# Patient Record
Sex: FEMALE | Race: WHITE | Employment: FULL TIME | URBAN - METROPOLITAN AREA
[De-identification: names, ages, dates, MRNs, and addresses within clinical notes are randomized per-mention and may not be internally consistent; named-entity substitution may affect disease eponyms.]

---

## 2022-07-06 ENCOUNTER — INITIAL PRENATAL (OUTPATIENT)
Dept: OBGYN CLINIC | Age: 27
End: 2022-07-06
Payer: COMMERCIAL

## 2022-07-06 VITALS — HEIGHT: 68 IN | WEIGHT: 166 LBS | BODY MASS INDEX: 25.16 KG/M2

## 2022-07-06 DIAGNOSIS — Z3A.08 8 WEEKS GESTATION OF PREGNANCY: ICD-10-CM

## 2022-07-06 DIAGNOSIS — Z34.01 ENCOUNTER FOR SUPERVISION OF NORMAL FIRST PREGNANCY IN FIRST TRIMESTER: ICD-10-CM

## 2022-07-06 DIAGNOSIS — Z32.01 PREGNANCY TEST POSITIVE: ICD-10-CM

## 2022-07-06 DIAGNOSIS — O36.80X0 ENCOUNTER TO DETERMINE FETAL VIABILITY OF PREGNANCY, SINGLE OR UNSPECIFIED FETUS: Primary | ICD-10-CM

## 2022-07-06 LAB
ABO, EXTERNAL RESULT: NORMAL
BASOPHILS # BLD: 0.1 K/UL (ref 0–0.2)
BASOPHILS NFR BLD: 1 % (ref 0–2)
DIFFERENTIAL METHOD BLD: NORMAL
EOSINOPHIL # BLD: 0.4 K/UL (ref 0–0.8)
EOSINOPHIL NFR BLD: 6 % (ref 0.5–7.8)
ERYTHROCYTE [DISTWIDTH] IN BLOOD BY AUTOMATED COUNT: 14 % (ref 11.9–14.6)
HCG, PREGNANCY, URINE, POC: POSITIVE
HCT VFR BLD AUTO: 36.4 % (ref 35.8–46.3)
HEP B, EXTERNAL RESULT: NORMAL
HEPATITIS C ANTIBODY, EXTERNAL RESULT: NORMAL
HGB BLD-MCNC: 12.1 G/DL (ref 11.7–15.4)
HIV, EXTERNAL RESULT: NORMAL
IMM GRANULOCYTES # BLD AUTO: 0 K/UL (ref 0–0.5)
IMM GRANULOCYTES NFR BLD AUTO: 0 % (ref 0–5)
LYMPHOCYTES # BLD: 2.2 K/UL (ref 0.5–4.6)
LYMPHOCYTES NFR BLD: 33 % (ref 13–44)
MCH RBC QN AUTO: 29.4 PG (ref 26.1–32.9)
MCHC RBC AUTO-ENTMCNC: 33.2 G/DL (ref 31.4–35)
MCV RBC AUTO: 88.3 FL (ref 79.6–97.8)
MONOCYTES # BLD: 0.4 K/UL (ref 0.1–1.3)
MONOCYTES NFR BLD: 6 % (ref 4–12)
NEUTS SEG # BLD: 3.5 K/UL (ref 1.7–8.2)
NEUTS SEG NFR BLD: 54 % (ref 43–78)
NRBC # BLD: 0 K/UL (ref 0–0.2)
PLATELET # BLD AUTO: 281 K/UL (ref 150–450)
PMV BLD AUTO: 10.7 FL (ref 9.4–12.3)
RBC # BLD AUTO: 4.12 M/UL (ref 4.05–5.2)
RH FACTOR, EXTERNAL RESULT: POSITIVE
RPR, EXTERNAL RESULT: NORMAL
RUBELLA TITER, EXTERNAL RESULT: NORMAL
VALID INTERNAL CONTROL, POC: YES
WBC # BLD AUTO: 6.6 K/UL (ref 4.3–11.1)

## 2022-07-06 PROCEDURE — 81025 URINE PREGNANCY TEST: CPT | Performed by: NURSE PRACTITIONER

## 2022-07-06 PROCEDURE — 76817 TRANSVAGINAL US OBSTETRIC: CPT | Performed by: NURSE PRACTITIONER

## 2022-07-06 NOTE — PROGRESS NOTES
John Kowalski G1, P0 presents to the office today for NOB talk and ultrasound. EDC is 2/13/23 based off of US. Patient education was discussed including: nutrition, appropriate weight gain, diet, exercise, travel, hospital classes, breastfeeding/lactation services, flu vaccine, Tdap, glucola, GBS, and Corona Virus and Zika precautions. Genetic testing discussed in depth and patient elects undecided. She is to return to the office in 4 weeks for NOB exam. All questions answered and she voiced full understanding. She is encouraged to call the office with any questions or concerns.

## 2022-07-07 LAB
ABO + RH BLD: NORMAL
BLOOD GROUP ANTIBODIES SERPL: NORMAL
HBV SURFACE AG SER QL: NONREACTIVE
HCV AB SER QL: NONREACTIVE
HIV 1+2 AB+HIV1 P24 AG SERPL QL IA: NONREACTIVE
HIV 1/2 RESULT COMMENT: NORMAL
RUBV IGG SERPL IA-ACNC: 14.3 IU/ML (ref 0–50)

## 2022-07-08 LAB — RPR SER QL: NON REACTIVE

## 2022-07-09 LAB
BACTERIA SPEC CULT: NORMAL
SERVICE CMNT-IMP: NORMAL

## 2022-07-14 ENCOUNTER — ROUTINE PRENATAL (OUTPATIENT)
Dept: OBGYN CLINIC | Age: 27
End: 2022-07-14
Payer: COMMERCIAL

## 2022-07-14 VITALS — HEIGHT: 68 IN | WEIGHT: 166.4 LBS | BODY MASS INDEX: 25.22 KG/M2

## 2022-07-14 DIAGNOSIS — O20.0 THREATENED ABORTION: Primary | ICD-10-CM

## 2022-07-14 PROCEDURE — 99214 OFFICE O/P EST MOD 30 MIN: CPT | Performed by: OBSTETRICS & GYNECOLOGY

## 2022-07-14 PROCEDURE — 76817 TRANSVAGINAL US OBSTETRIC: CPT | Performed by: OBSTETRICS & GYNECOLOGY

## 2022-07-14 NOTE — PROGRESS NOTES
The patient is a 28 y.o. Jolie Felling who is seen for TAB ultrasound performed secondary to spotting and cramping localized on the left side. EOB ultrasound finding of 22: Single IUD with +FHT= 183 BPM. CRL is not consistent with EDC determined by LMP. Ultrasound assigned SHAHAB= 2023. YS visualized. Right ovary visualized with follicles and appears within normal limits. Left ovary visualized with follicles and CL. Uterus is anteverted. Cervix appears within normal limits. Ultrasound findings of today: Single IUP +FHT= 186. Patient had early OB ultrasound on 22. At that time CRL measured 8W2D. Today's ultrasound demonstrated a CRL of 8w6d. Based off previous ultrasound the CRL should be approximately 9w3d. YS visualized. Right ovary visualized with follicles and appears within normal limits. Left ovary visualized with follicles and appears within normal limits. Uterus is anteverted and homogenous. A [facility] is noted measuring 1.2 x 0.7 x 0.7 cm. Cervix appears within normal limits. HISTORY:    Patient's last menstrual period was 2022. Current Outpatient Medications on File Prior to Visit   Medication Sig Dispense Refill    Prenatal-FeFum-FA-DHA w/o A (PRENATAL + DHA PO) Take by mouth       No current facility-administered medications on file prior to visit. ROS:  Feeling well. No dyspnea or chest pain on exertion. No abdominal pain, change in bowel habits, black or bloody stools. No urinary tract symptoms, she complains of vaginal bleeding since last pm.    PHYSICAL EXAM:  Height 5' 8\" (1.727 m), weight 166 lb 6.4 oz (75.5 kg), last menstrual period 2022. The patient appears well, alert, oriented x 3, in no distress. ASSESSMENT:    1. Threatened   -     AMB POC US OB TRANSVAGINAL   Viable single IUP at 8w 6 days, still within growth expectations. PLAN:  All questions answered  Pelvic ultrasound reviewed, will repeat next week.  Miscarriage

## 2022-07-21 ENCOUNTER — ROUTINE PRENATAL (OUTPATIENT)
Dept: OBGYN CLINIC | Age: 27
End: 2022-07-21
Payer: COMMERCIAL

## 2022-07-21 VITALS — BODY MASS INDEX: 25.09 KG/M2 | WEIGHT: 165 LBS

## 2022-07-21 DIAGNOSIS — O03.9 SAB (SPONTANEOUS ABORTION): Primary | ICD-10-CM

## 2022-07-21 PROCEDURE — 76817 TRANSVAGINAL US OBSTETRIC: CPT | Performed by: OBSTETRICS & GYNECOLOGY

## 2022-07-21 PROCEDURE — 99214 OFFICE O/P EST MOD 30 MIN: CPT | Performed by: OBSTETRICS & GYNECOLOGY

## 2022-07-21 NOTE — PROGRESS NOTES
Subjective:      Mahi Caldera is a 28 y. o. who presents for SAB follow up ultrasound. After she left the office on 22 she experienced heavy vaginal bleeding and passed large clots/ tissue. She is barely bleeding now. Discussed that her us looks like she passed everything. She has only been  3 months and they were not really trying to conceive. She does not want to go on birth control. Discussed not anything she did. If had 3 miscarriage then would do workup. Continue pnv but really can try again after 1 cycle. Her  is present today. EOB ultrasound finding of 22: Single IUD with +FHT= 183 BPM. CRL is not consistent with EDC determined by LMP. Ultrasound assigned SHAHAB= 2023. YS visualized. Right ovary visualized with follicles and appears within normal limits. Left ovary visualized with follicles and CL. Uterus is anteverted. Cervix appears within normal limits. Ultrasound findings from 22: Single IUP +FHT= 186. Patient had early OB ultrasound on 22. At that time CRL measured 8W2D. Today's ultrasound demonstrated a CRL of 8w6d. Based off previous ultrasound the CRL should be approximately 9w3d. YS visualized. Right ovary visualized with follicles and appears within normal limits. Left ovary visualized with follicles and appears within normal limits. Uterus is anteverted and homogenous. A [facility] is noted measuring 1.2 x 0.7 x 0.7 cm. Cervix appears within normal limits. Today's ultrasound: Cervix appears within normal limits. Uterus is anteverted and homogenous. Endo= 5.7 mm, no RPOC seen. Findings c/w complete AB. Right ovary visualized with follicles and appears within normal limits. Left ovary visualized with follicles and appears within normal limits. No adnexa masses or fluid seen. Uterus volume= 99.39 ml.     Past Medical History:   Diagnosis Date    ACL (anterior cruciate ligament) tear     Left Knee x 2    Labral tear of shoulder     Right shoulder Mononucleosis      Past Surgical History:   Procedure Laterality Date    ANTERIOR CRUCIATE LIGAMENT REPAIR Left     Knee x 2    ORTHOPEDIC SURGERY Right     Shoulder    TYMPANOSTOMY TUBE PLACEMENT      WISDOM TOOTH EXTRACTION        Family History   Problem Relation Age of Onset    Heart Attack Paternal Grandfather     Heart Failure Paternal Grandmother         CABG    Diabetes Maternal Grandfather         Pre- diabetes    Prostate Cancer Maternal Grandfather     Cancer Maternal Grandfather         bladder    No Known Problems Father     Hypertension Mother      Social History     Tobacco Use    Smoking status: Never    Smokeless tobacco: Never   Substance Use Topics    Alcohol use: Not Currently      Prior to Admission medications    Medication Sig Start Date End Date Taking? Authorizing Provider   Prenatal-FeFum-FA-DHA w/o A (PRENATAL + DHA PO) Take by mouth   Yes Historical Provider, MD        No Known Allergies  Rh status:positive     Review of Systems:  Neg       Objective:     Vitals:    22 1316   Weight: 165 lb (74.8 kg)        Ultrasound results:see above. Physical Exam:  General:well nourished well developed female in nad   Heart rrr  Lungs cta b&s  Abdomen soft nontender. No enlargement of liver. Extremities: no calf pain  Pelvic exam: deferred . Assessment:     Assessment & Plan  complete      Plan:     Orders Placed This Encounter   Procedures    AMB POC US OB TRANSVAGINAL     Order Specific Question:   Reason for Exam:     Answer:   EOB     Order Specific Question:   Are you Pregnant? Answer:   Yes       Pt is healthy. No thoughts that this will happen again. Rtc for annual exam soon.